# Patient Record
Sex: FEMALE | Race: WHITE | URBAN - METROPOLITAN AREA
[De-identification: names, ages, dates, MRNs, and addresses within clinical notes are randomized per-mention and may not be internally consistent; named-entity substitution may affect disease eponyms.]

---

## 2022-03-28 ENCOUNTER — WEB ENCOUNTER (OUTPATIENT)
Dept: URBAN - METROPOLITAN AREA CLINIC 2 | Facility: CLINIC | Age: 27
End: 2022-03-28

## 2022-03-28 ENCOUNTER — OFFICE VISIT (OUTPATIENT)
Dept: URBAN - METROPOLITAN AREA CLINIC 2 | Facility: CLINIC | Age: 27
End: 2022-03-28
Payer: MEDICAID

## 2022-03-28 ENCOUNTER — DASHBOARD ENCOUNTERS (OUTPATIENT)
Age: 27
End: 2022-03-28

## 2022-03-28 DIAGNOSIS — K57.92 DIVERTICULITIS: ICD-10-CM

## 2022-03-28 PROCEDURE — 99204 OFFICE O/P NEW MOD 45 MIN: CPT | Performed by: NURSE PRACTITIONER

## 2022-03-28 RX ORDER — DICYCLOMINE HYDROCHLORIDE 20 MG/1
1 TABLET TABLET ORAL
Qty: 30 TABLET | Refills: 0 | OUTPATIENT
Start: 2022-03-28 | End: 2022-04-27

## 2022-03-28 RX ORDER — CIPROFLOXACIN 500 MG/1
1 TABLET TABLET, FILM COATED ORAL
Qty: 28 TABLET | Refills: 0 | OUTPATIENT
Start: 2022-03-28 | End: 2022-04-11

## 2022-03-28 RX ORDER — SULFAMETHOXAZOLE AND TRIMETHOPRIM 800; 160 MG/1; MG/1
1 TABLET TABLET ORAL TWICE A DAY
Qty: 28 TABLET | Refills: 0 | OUTPATIENT
Start: 2022-03-28 | End: 2022-04-11

## 2022-03-28 NOTE — PHYSICAL EXAM GASTROINTESTINAL
Abdomen , soft, left sided tenderness to palpation, nondistended , no guarding or rigidity , no masses palpable , normal bowel sounds , Liver and Spleen , liver nontender , spleen not palpable

## 2022-03-28 NOTE — HPI-TODAY'S VISIT:
The patient was referred by Dr. Giles White for diverticulitis .   A copy of this document is being forwarded to the referring provider. Very pleasant 26-year-old female seen today after 2 ER visits for acute diverticulitis.  CT did demonstrate descending colon diverticulitis.  Patient was given antibiotics and instructed to follow-up with GI. She completed 2 weeks of cipro/flagyl but continues to have left sided pain. afebrile.  grandmother and mother had severe diverticulitis and required surgical resection

## 2022-05-19 PROBLEM — 307496006: Status: ACTIVE | Noted: 2022-03-28

## 2022-06-02 ENCOUNTER — OFFICE VISIT (OUTPATIENT)
Dept: URBAN - METROPOLITAN AREA SURGERY CENTER 19 | Facility: SURGERY CENTER | Age: 27
End: 2022-06-02